# Patient Record
Sex: MALE | Race: WHITE | Employment: FULL TIME | URBAN - METROPOLITAN AREA
[De-identification: names, ages, dates, MRNs, and addresses within clinical notes are randomized per-mention and may not be internally consistent; named-entity substitution may affect disease eponyms.]

---

## 2022-06-10 ENCOUNTER — HOSPITAL ENCOUNTER (EMERGENCY)
Facility: HOSPITAL | Age: 52
Discharge: HOME | End: 2022-06-10
Admitting: PHYSICIAN ASSISTANT
Payer: COMMERCIAL

## 2022-06-10 VITALS
DIASTOLIC BLOOD PRESSURE: 83 MMHG | SYSTOLIC BLOOD PRESSURE: 166 MMHG | WEIGHT: 315 LBS | HEART RATE: 77 BPM | RESPIRATION RATE: 16 BRPM | OXYGEN SATURATION: 98 % | TEMPERATURE: 98.6 F

## 2022-06-10 DIAGNOSIS — S81.802A WOUND OF LEFT LOWER EXTREMITY, INITIAL ENCOUNTER: ICD-10-CM

## 2022-06-10 PROBLEM — K63.5 POLYP OF COLON: Status: ACTIVE | Noted: 2022-01-07

## 2022-06-10 PROBLEM — E66.01 MORBID OBESITY (H): Status: ACTIVE | Noted: 2018-09-05

## 2022-06-10 PROBLEM — I83.90 VARICOSE VEINS OF LOWER EXTREMITY: Status: ACTIVE | Noted: 2020-10-09

## 2022-06-10 PROBLEM — M87.059: Status: ACTIVE | Noted: 2018-09-05

## 2022-06-10 PROBLEM — U07.1 COVID-19: Status: ACTIVE | Noted: 2020-12-09

## 2022-06-10 PROBLEM — E29.1 ANDROGEN DEFICIENCY: Status: ACTIVE | Noted: 2017-10-04

## 2022-06-10 PROCEDURE — 99281 EMR DPT VST MAYX REQ PHY/QHP: CPT

## 2022-06-10 ASSESSMENT — ENCOUNTER SYMPTOMS
FATIGUE: 0
COLOR CHANGE: 0
PALPITATIONS: 0
DIZZINESS: 0
SHORTNESS OF BREATH: 0
NAUSEA: 0
MYALGIAS: 0
WOUND: 1
ARTHRALGIAS: 0
CONFUSION: 0
LIGHT-HEADEDNESS: 0

## 2022-06-11 NOTE — ED NOTES
Bleeding remained stopped while lying in bed with feet up for approximately 10 minutes.  Patient instructed to sit on side of bed with feet dangling.  Patient currently sitting on side of bed with no current bleeding to site.  Will monitor.

## 2022-06-11 NOTE — DISCHARGE INSTRUCTIONS
You were seen in the ER for a bleeding vessel on your left lower leg. The pressure dressing was removed and there was no additional bleeding. Keep this area covered and do not scratch at it.

## 2022-06-11 NOTE — ED PROVIDER NOTES
EMERGENCY DEPARTMENT ENCOUNTER      NAME: Craig J Friedel  AGE: 51 year old male  YOB: 1970  MRN: [id number]  EVALUATION DATE & TIME: 6/10/2022  7:18 PM    PCP: No primary care provider on file.    ED PROVIDER: Jahaira Aguilar PA-C      Chief Complaint   Patient presents with     vein bleeding       FINAL IMPRESSION:  1. Wound of left lower extremity, initial encounter        MEDICAL DECISION MAKING:    Pertinent Labs & Imaging studies reviewed. (See chart for details)  51 year old male with a h/o obesity presents to the Emergency Department for evaluation of left lower leg bleeding after receiving massage during a pedicure today, superficial vessel opened and was spurting blood.  EMS called and placed a pressure dressing.  On exam he is alert, nontoxic-appearing and in no acute distress.  Vital signs are WNL.  Pressure dressing in place was removed and no evidence of recent bleeding.  He was given a positional trial without dressing both with leg on bed and leg dangling off of bed and then walking without recurrence of bleeding.  He was recommended to keep a close eye on this area, not to scratch or rub.  He is not anticoagulated and does not take aspirin.    There is no evidence of acute or emergent process requiring intervention at this time. Pt is appropriate for outpatient management. Provisional nature of today's diagnosis was discussed and strict return precautions were given. Pt expressed understanding and He was discharged to home in good condition.     CRITICAL CARE: None    ED COURSE  8:00 PM  Met and evaluated patient. Discussed ED plan.   8:22 PM discharged to home in good condition by RN.     MEDICATIONS GIVEN IN THE EMERGENCY:  Medications - No data to display    NEW PRESCRIPTIONS STARTED AT TODAY'S ER VISIT  New Prescriptions    No medications on file        =================================================================    HPI    Patient information was obtained from: Patient  "    Use of Intrepreter: N/A       Craig J Friedel is a 51 year old male who presents for evaluation of bleeding on the left lower leg.  [place], during massage of the left lower leg a superficial vessel opened and was bleeding copiously, described as \"spurting.\"  Reportedly several towels filled with blood and EMS was called.  Pressure dressing applied and he was transported to the emergency department.  No lightheadedness, dizziness.  Does not take blood thinners or aspirin.  Has not had this happen before.      REVIEW OF SYSTEMS   Review of Systems   Constitutional: Negative for fatigue.   Respiratory: Negative for shortness of breath.    Cardiovascular: Negative for palpitations.   Gastrointestinal: Negative for nausea.   Musculoskeletal: Negative for arthralgias and myalgias.   Skin: Positive for wound (pea sized superficial vessel at medial aspect left lower leg). Negative for color change, pallor and rash.   Neurological: Negative for dizziness and light-headedness.   Psychiatric/Behavioral: Negative for confusion.   All other systems reviewed and are negative.    PAST MEDICAL HISTORY:  No past medical history on file.    PAST SURGICAL HISTORY:  No past surgical history on file.      CURRENT MEDICATIONS:    No current outpatient medications on file.      ALLERGIES:  No Known Allergies    FAMILY HISTORY:  No family history on file.    SOCIAL HISTORY:   Social History     Socioeconomic History     Marital status:      Spouse name: Not on file     Number of children: Not on file     Years of education: Not on file     Highest education level: Not on file   Occupational History     Not on file   Tobacco Use     Smoking status: Not on file     Smokeless tobacco: Not on file   Substance and Sexual Activity     Alcohol use: Not on file     Drug use: Not on file     Sexual activity: Not on file   Other Topics Concern     Not on file   Social History Narrative     Not on " file     Social Determinants of Health     Financial Resource Strain: Not on file   Food Insecurity: Not on file   Transportation Needs: Not on file   Physical Activity: Not on file   Stress: Not on file   Social Connections: Not on file   Intimate Partner Violence: Not on file   Housing Stability: Not on file         VITALS:  Patient Vitals for the past 24 hrs:   BP Temp Temp src Pulse Resp SpO2 Weight   06/10/22 1945 (!) 152/71 -- -- 78 -- 96 % --   06/10/22 1930 (!) 155/70 -- -- 80 -- 97 % --   06/10/22 1919 (!) 165/76 98.6  F (37  C) Oral 81 16 96 % (!) 170.1 kg (375 lb)       PHYSICAL EXAM    Physical Exam  Vitals reviewed.   Constitutional:       General: He is not in acute distress.     Appearance: He is well-developed. He is not ill-appearing, toxic-appearing or diaphoretic.   HENT:      Head: Normocephalic and atraumatic.      Nose: Nose normal.      Mouth/Throat:      Mouth: Mucous membranes are moist.      Pharynx: Oropharynx is clear. No oropharyngeal exudate.   Eyes:      General: No scleral icterus.     Conjunctiva/sclera: Conjunctivae normal.   Cardiovascular:      Rate and Rhythm: Normal rate.      Pulses: Normal pulses.           Dorsalis pedis pulses are 2+ on the left side.   Pulmonary:      Effort: Pulmonary effort is normal. No tachypnea.      Breath sounds: No decreased breath sounds.   Musculoskeletal:      Cervical back: Normal range of motion.      Right lower leg: Edema (2+) present.      Left lower leg: Edema (2+) present.      Comments: Left lower leg with compression dressing in place, removed and pea sized superficial vessel without evidence of recent bleeding. No bleeding currently.    Skin:     General: Skin is warm and dry.      Capillary Refill: Capillary refill takes less than 2 seconds.   Neurological:      General: No focal deficit present.      Mental Status: He is alert and oriented to person, place, and time.   Psychiatric:         Mood and Affect: Mood normal.          Behavior: Behavior normal.          LAB:  All pertinent labs reviewed and interpreted.    Labs Ordered and Resulted from Time of ED Arrival to Time of ED Departure - No data to display      RADIOLOGY:  Reviewed all pertinent imaging. Please see official radiology report    No orders to display       Jahaira Aguilar PA-C  Emergency Medicine  [facility] EMERGENCY DEPARTMENT  [address] [place] [id number]  [phone]  Dept: [phone]    This note has in part been created with speech recognition technology and may create an occasional, unintended word/grammar substitution. Errors are generally corrected in real time. Please message me via Flooved In Basket if you note any errors requiring clarification.       Jahaira Aguilar PA-C  06/10/22 [id number]

## 2022-06-11 NOTE — ED TRIAGE NOTES
"Pt brought in by [facility] for bleeding vein on left lower leg.  [place] and at the end, when wiping leg a vein started \"spurting\" blood.  Pressure dressing applied by EMS.  No bleeding through dressing at this time     Triage Assessment     Row Name 06/10/22 1920       Triage Assessment (Adult)    Airway WDL WDL       Respiratory WDL    Respiratory WDL WDL       Skin Circulation/Temperature WDL    Skin Circulation/Temperature WDL X  bleeding vein on left lower leg       Cardiac WDL    Cardiac WDL WDL       Peripheral/Neurovascular WDL    Peripheral Neurovascular WDL WDL       Cognitive/Neuro/Behavioral WDL    Cognitive/Neuro/Behavioral WDL WDL              "